# Patient Record
Sex: FEMALE | Race: WHITE | Employment: PART TIME | URBAN - METROPOLITAN AREA
[De-identification: names, ages, dates, MRNs, and addresses within clinical notes are randomized per-mention and may not be internally consistent; named-entity substitution may affect disease eponyms.]

---

## 2018-08-23 ENCOUNTER — OFFICE VISIT (OUTPATIENT)
Dept: ORTHOPEDIC SURGERY | Age: 58
End: 2018-08-23
Payer: COMMERCIAL

## 2018-08-23 VITALS — WEIGHT: 139 LBS | HEIGHT: 64 IN | BODY MASS INDEX: 23.73 KG/M2

## 2018-08-23 DIAGNOSIS — M22.42 CHONDROMALACIA, PATELLA, LEFT: Primary | ICD-10-CM

## 2018-08-23 DIAGNOSIS — M22.41 CHONDROMALACIA OF RIGHT PATELLA: ICD-10-CM

## 2018-08-23 PROCEDURE — 3017F COLORECTAL CA SCREEN DOC REV: CPT | Performed by: ORTHOPAEDIC SURGERY

## 2018-08-23 PROCEDURE — 99213 OFFICE O/P EST LOW 20 MIN: CPT | Performed by: ORTHOPAEDIC SURGERY

## 2018-08-23 PROCEDURE — 4004F PT TOBACCO SCREEN RCVD TLK: CPT | Performed by: ORTHOPAEDIC SURGERY

## 2018-08-23 PROCEDURE — G8427 DOCREV CUR MEDS BY ELIG CLIN: HCPCS | Performed by: ORTHOPAEDIC SURGERY

## 2018-08-23 PROCEDURE — G8420 CALC BMI NORM PARAMETERS: HCPCS | Performed by: ORTHOPAEDIC SURGERY

## 2018-08-23 RX ORDER — OMALIZUMAB 202.5 MG/1.4ML
INJECTION, SOLUTION SUBCUTANEOUS
COMMUNITY
Start: 2018-08-21

## 2018-08-29 ENCOUNTER — HOSPITAL ENCOUNTER (OUTPATIENT)
Dept: PHYSICAL THERAPY | Age: 58
Setting detail: THERAPIES SERIES
Discharge: HOME | End: 2018-08-29
Payer: COMMERCIAL

## 2018-08-29 PROCEDURE — 97110 THERAPEUTIC EXERCISES: CPT

## 2018-08-29 PROCEDURE — 97163 PT EVAL HIGH COMPLEX 45 MIN: CPT

## 2018-08-29 ASSESSMENT — PAIN SCALES - GENERAL: PAINLEVEL_OUTOF10: 6

## 2018-08-29 ASSESSMENT — PAIN DESCRIPTION - ORIENTATION: ORIENTATION: RIGHT;LEFT

## 2018-08-29 ASSESSMENT — PAIN DESCRIPTION - LOCATION: LOCATION: KNEE;HIP;LEG

## 2018-08-29 NOTE — PROGRESS NOTES
better on LEFS    Patient's Goal:   Walk without increase knee and leg pain    Timed Code Treatment Minutes: 15 Minutes  Total Treatment Time: 45     Time In: 14:45  Time Out: 15:30    Tara Chung  8/29/2018

## 2018-09-05 ENCOUNTER — HOSPITAL ENCOUNTER (OUTPATIENT)
Dept: PHYSICAL THERAPY | Age: 58
Setting detail: THERAPIES SERIES
Discharge: HOME | End: 2018-09-05
Payer: COMMERCIAL

## 2018-09-05 PROCEDURE — 97110 THERAPEUTIC EXERCISES: CPT

## 2018-09-05 ASSESSMENT — PAIN SCALES - GENERAL: PAINLEVEL_OUTOF10: 5

## 2018-09-05 NOTE — PROGRESS NOTES
Phone: [address] Duenas      Fax: [phone]                            Outpatient Physical Therapy                                                                            Daily Note    Date: 2018  Patient Name: Jackie Mcdonald        MRN: [id number]   ACCT#:  [de-identified]  : 1960  (60 y.o.)    Referring Practitioner: Dr Yuan Constantino    Referral Date : 18    Diagnosis: B knee chondromalacia patella  Treatment Diagnosis: B knee pain, B leg pain    Onset Date: 18 (Dr Easley)  PT Insurance Information: Medical [place]  Total # of Visits Approved: 18 Per Physician Order  Total # of Visits to Date: 2    Pre-Treatment Pain: 5/10     Assessment  Assessment: Reports some compliance with HEP over holiday weekend. Pain prior to session is 5/10. Good tolerance to addition of exercises and stretches today. Continue to progress as able. Chart Reviewed: Yes    Plan  Plan: Plan of care initiated    Exercises/Modalities/Manual:  See DocFlow Sheet    Education:       Goals  (Total # of Visits to Date: 2) Short Term Goals - Time Frame for Short term goals: 10  Short term goal 1: Patient to be independent with HEP  Short term goal 2: Increase ROM left hip ER 55 degrees  Short term goal 3: Increase strength B hip adduction 4/5  Short term goal 4:  Increase AROM left hip extension 10 degrees with strength left hip extension 4/5       Long Term Goals - Time Frame for Long term goals : 25  Long term goal 1: Gait WFL with even stride length B  Long term goal 2: Decrease pain B Knees and legs 2/10 x 3 days   Long term goal 3: Improve functional mobility with score of 60/80 or better on LEFS          Post Treatment Pain:4-5  /10    Time In:1312  Time Out : 1452        Timed Code Treatment Minutes: 40 Minutes  Total Treatment Time: 36 Minutes    [facility] License Number: [id number]    Date: 2018

## 2018-09-07 ENCOUNTER — HOSPITAL ENCOUNTER (OUTPATIENT)
Dept: PHYSICAL THERAPY | Age: 58
Setting detail: THERAPIES SERIES
Discharge: HOME | End: 2018-09-07
Payer: COMMERCIAL

## 2018-09-07 PROCEDURE — 97110 THERAPEUTIC EXERCISES: CPT

## 2018-09-07 ASSESSMENT — PAIN SCALES - GENERAL: PAINLEVEL_OUTOF10: 5

## 2018-09-12 ENCOUNTER — HOSPITAL ENCOUNTER (OUTPATIENT)
Dept: PHYSICAL THERAPY | Age: 58
Setting detail: THERAPIES SERIES
Discharge: HOME | End: 2018-09-12
Payer: COMMERCIAL

## 2018-09-12 PROCEDURE — 97110 THERAPEUTIC EXERCISES: CPT

## 2018-09-12 ASSESSMENT — PAIN SCALES - GENERAL: PAINLEVEL_OUTOF10: 5

## 2018-09-12 ASSESSMENT — PAIN DESCRIPTION - ORIENTATION: ORIENTATION: RIGHT;LEFT

## 2018-09-12 ASSESSMENT — PAIN DESCRIPTION - LOCATION: LOCATION: HIP;KNEE;LEG

## 2018-09-14 ENCOUNTER — HOSPITAL ENCOUNTER (OUTPATIENT)
Dept: PHYSICAL THERAPY | Age: 58
Setting detail: THERAPIES SERIES
Discharge: HOME | End: 2018-09-14
Payer: COMMERCIAL

## 2018-09-14 PROCEDURE — 97140 MANUAL THERAPY 1/> REGIONS: CPT

## 2018-09-14 PROCEDURE — 97110 THERAPEUTIC EXERCISES: CPT

## 2018-09-14 ASSESSMENT — PAIN SCALES - GENERAL: PAINLEVEL_OUTOF10: 5

## 2018-09-14 NOTE — PROGRESS NOTES
Phone: Yovana Duenas      Fax: [phone]                            Outpatient Physical Therapy                                                                            Daily Note    Date: 2018  Patient Name: Bea Avery        MRN: [id number]   ACCT#:  [de-identified]  : 1960  (60 y.o.)    Referring Practitioner: Dr Liza Cornejo    Referral Date : 18    Diagnosis: B knee chondromalacia patella  Treatment Diagnosis: B knee pain, B leg pain    Onset Date: 18 (Dr Easley)  PT Insurance Information: Medical [place]  Total # of Visits Approved: 18 Per Physician Order  Total # of Visits to Date: 6    Pre-Treatment Pain:  5/10     Assessment  Assessment: Patient reports able to climb up and up stairs using reciprocal pattern without using UE for assistance. PROM L ER = 54 degrees. Chart Reviewed: Yes    Plan  Plan: Continue with current plan    Exercises/Modalities/Manual:  See DocFlow Sheet    Education:           Goals  (Total # of Visits to Date: 6)   Short Term Goals - Time Frame for Short term goals: 10  Short term goal 1: Patient to be independent with HEP-Met  Short term goal 2: Increase ROM left hip ER 55 degrees  Short term goal 3: Increase strength B hip adduction 4/5  Short term goal 4:  Increase AROM left hip extension 10 degrees with strength left hip extension 4/5       Long Term Goals - Time Frame for Long term goals : 25  Long term goal 1: Gait WFL with even stride length B  Long term goal 2: Decrease pain B Knees and legs 2/10 x 3 days   Long term goal 3: Improve functional mobility with score of 60/80 or better on LEFS          Post Treatment Pain:  6/10    Time In: 1330  Time Out: 1418  Timed Code Treatment Minutes: 48 Minutes  Total Treatment Time: [facility] Number: PTA    Date: 2018

## 2018-09-17 ENCOUNTER — APPOINTMENT (OUTPATIENT)
Dept: PHYSICAL THERAPY | Age: 58
End: 2018-09-17
Payer: COMMERCIAL

## 2018-09-19 ENCOUNTER — HOSPITAL ENCOUNTER (OUTPATIENT)
Dept: PHYSICAL THERAPY | Age: 58
Setting detail: THERAPIES SERIES
Discharge: HOME | End: 2018-09-19
Payer: COMMERCIAL

## 2018-09-19 PROCEDURE — 97110 THERAPEUTIC EXERCISES: CPT

## 2018-09-19 PROCEDURE — 97140 MANUAL THERAPY 1/> REGIONS: CPT

## 2018-09-19 ASSESSMENT — PAIN SCALES - GENERAL: PAINLEVEL_OUTOF10: 6

## 2018-09-20 ENCOUNTER — OFFICE VISIT (OUTPATIENT)
Dept: ORTHOPEDIC SURGERY | Age: 58
End: 2018-09-20
Payer: COMMERCIAL

## 2018-09-20 VITALS — WEIGHT: 139 LBS | BODY MASS INDEX: 23.73 KG/M2 | HEIGHT: 64 IN

## 2018-09-20 DIAGNOSIS — M22.41 CHONDROMALACIA OF RIGHT PATELLA: Primary | ICD-10-CM

## 2018-09-20 PROCEDURE — G8427 DOCREV CUR MEDS BY ELIG CLIN: HCPCS | Performed by: ORTHOPAEDIC SURGERY

## 2018-09-20 PROCEDURE — 99213 OFFICE O/P EST LOW 20 MIN: CPT | Performed by: ORTHOPAEDIC SURGERY

## 2018-09-20 PROCEDURE — 1036F TOBACCO NON-USER: CPT | Performed by: ORTHOPAEDIC SURGERY

## 2018-09-20 PROCEDURE — G8420 CALC BMI NORM PARAMETERS: HCPCS | Performed by: ORTHOPAEDIC SURGERY

## 2018-09-20 PROCEDURE — 3017F COLORECTAL CA SCREEN DOC REV: CPT | Performed by: ORTHOPAEDIC SURGERY

## 2018-09-21 ENCOUNTER — HOSPITAL ENCOUNTER (OUTPATIENT)
Dept: PHYSICAL THERAPY | Age: 58
Setting detail: THERAPIES SERIES
Discharge: HOME | End: 2018-09-21
Payer: COMMERCIAL

## 2018-09-21 PROCEDURE — 97110 THERAPEUTIC EXERCISES: CPT

## 2018-09-21 ASSESSMENT — PAIN SCALES - GENERAL: PAINLEVEL_OUTOF10: 6

## 2018-09-24 ENCOUNTER — HOSPITAL ENCOUNTER (OUTPATIENT)
Dept: PHYSICAL THERAPY | Age: 58
Setting detail: THERAPIES SERIES
Discharge: HOME | End: 2018-09-24
Payer: COMMERCIAL

## 2018-09-24 PROCEDURE — 97140 MANUAL THERAPY 1/> REGIONS: CPT

## 2018-09-24 PROCEDURE — 97110 THERAPEUTIC EXERCISES: CPT

## 2018-09-24 ASSESSMENT — PAIN DESCRIPTION - LOCATION: LOCATION: KNEE

## 2018-09-24 ASSESSMENT — PAIN SCALES - GENERAL: PAINLEVEL_OUTOF10: 6

## 2018-09-24 ASSESSMENT — PAIN DESCRIPTION - ORIENTATION: ORIENTATION: RIGHT

## 2018-09-24 NOTE — PROGRESS NOTES
Phone: [address] [facility]      Fax: [phone]                            Outpatient Physical Therapy                                                                            Daily Note    Date: 2018  Patient Name: Laverne Mariee        MRN: [id number]   ACCT#:  [de-identified]  : 1960  (60 y.o.)    Referring Practitioner: Dr Scott Cooper    Referral Date : 18    Diagnosis: B knee chondromalacia patella  Treatment Diagnosis: B knee pain, B leg pain    Onset Date: 18 (Dr Easley)  PT Insurance Information: Medical [place]  Total # of Visits Approved: 18 Per Physician Order  Total # of Visits to Date: 9    Pre-Treatment Pain:  6/10     Assessment  Assessment: Main c/o pain 6/10 right knee. Patient notes some improvement in strength, she can alternate feetbup stairs without handrails, but still non-alternate down stairs. Strength MMT hip adduction 4/5 B; hip extension right 4-/5, left 4/5. Hip extension left leg over 10 degrees. Patient met [facility], work toward [address] [place] [address] Chart Reviewed: Yes    Plan  Plan: Continue with current plan    Exercises/Modalities/Manual:  See DocFlow Sheet    Education:           Goals  (Total # of Visits to Date: 5)   Short Term Goals - Time Frame for Short term goals: 10  Short term goal 1: Patient to be independent with HEP-Met  Short term goal 2: Increase ROM left hip ER 55 degrees- Met  Short term goal 3: Increase strength B hip adduction 4/5-Met  Short term goal 4:  Increase AROM left hip extension 10 degrees with strength left hip extension 4/5-Met       Long Term Goals - Time Frame for Long term goals : 25  Long term goal 1: Gait WFL with even stride length B  Long term goal 2: Decrease pain B Knees and legs 2/10 x 3 days   Long term goal 3: Improve functional mobility with score of 60/80 or better on LEFS          Post Treatment Pain:  /10    Time In: 13:10    Time Out : 14:00        Timed Code Treatment Minutes: 50 Minutes  Total

## 2018-09-28 ENCOUNTER — HOSPITAL ENCOUNTER (OUTPATIENT)
Dept: PHYSICAL THERAPY | Age: 58
Setting detail: THERAPIES SERIES
Discharge: HOME | End: 2018-09-28
Payer: COMMERCIAL

## 2018-09-28 PROCEDURE — 97140 MANUAL THERAPY 1/> REGIONS: CPT

## 2018-09-28 PROCEDURE — 97110 THERAPEUTIC EXERCISES: CPT

## 2018-09-28 ASSESSMENT — PAIN SCALES - GENERAL: PAINLEVEL_OUTOF10: 5

## 2018-09-28 NOTE — PROGRESS NOTES
Phone: [address]      Fax: [phone]                            Outpatient Physical Therapy                                                                            Daily Note    Date: 2018  Patient Name: Alayna Shannon        MRN: [id number]   ACCT#:  [de-identified]  : 1960  (60 y.o.)    Referring Practitioner: Dr Wallace Junior    Referral Date : 18    Diagnosis: B knee chondromalacia patella  Treatment Diagnosis: B knee pain, B leg pain    Onset Date: 18 (Dr Easley)  PT Insurance Information: Medical [place]  Total # of Visits Approved: 18 Per Physician Order  Total # of Visits to Date: 10  No Show: 0  Canceled Appointment: 0    Pre-Treatment Pain:  10     Assessment  Assessment: Pt able to ambulate up and down 6\" step in clinic reciprically today. She notes at times she can descend stairs reciprically in community but can always ascend reciprically. Pt is pleased with progress. Chart Reviewed: Yes    Plan  Plan: Continue with current plan    Exercises/Modalities/Manual:  See DocFlow Sheet    Goals  (Total # of Visits to Date: 10)   Short Term Goals - Time Frame for Short term goals: 10  Short term goal 1: Patient to be independent with HEP-Met  Short term goal 2: Increase ROM left hip ER 55 degrees- Met  Short term goal 3: Increase strength B hip adduction 4/5-Met  Short term goal 4:  Increase AROM left hip extension 10 degrees with strength left hip extension 4/5-Met    Long Term Goals - Time Frame for Long term goals : 25  Long term goal 1: Gait WFL with even stride length B  Long term goal 2: Decrease pain B Knees and legs 2/10 x 3 days   Long term goal 3: Improve functional mobility with score of 60/80 or better on LEFS    Post Treatment Pain:  /10    Time In: 1330    Time Out : 1410  Timed Code Treatment Minutes: 40 Minutes  Total Treatment Time: 36 Minutes    [facility] License Number: [id number]    Date: 2018

## 2019-11-05 ENCOUNTER — TELEPHONE (OUTPATIENT)
Dept: ORTHOPEDIC SURGERY | Age: 59
End: 2019-11-05

## 2019-11-05 DIAGNOSIS — M17.11 PATELLOFEMORAL ARTHRITIS OF RIGHT KNEE: Primary | ICD-10-CM

## 2019-11-19 ENCOUNTER — OFFICE VISIT (OUTPATIENT)
Dept: ORTHOPEDIC SURGERY | Age: 59
End: 2019-11-19
Payer: COMMERCIAL

## 2019-11-19 VITALS — BODY MASS INDEX: 23.73 KG/M2 | HEIGHT: 64 IN | WEIGHT: 139 LBS

## 2019-11-19 DIAGNOSIS — M17.31 POST-TRAUMATIC OSTEOARTHRITIS OF RIGHT KNEE: Primary | ICD-10-CM

## 2019-11-19 PROBLEM — M17.11 OSTEOARTHRITIS OF RIGHT PATELLOFEMORAL JOINT: Status: ACTIVE | Noted: 2019-11-19

## 2019-11-19 PROCEDURE — 3017F COLORECTAL CA SCREEN DOC REV: CPT | Performed by: ORTHOPAEDIC SURGERY

## 2019-11-19 PROCEDURE — 1036F TOBACCO NON-USER: CPT | Performed by: ORTHOPAEDIC SURGERY

## 2019-11-19 PROCEDURE — 99213 OFFICE O/P EST LOW 20 MIN: CPT | Performed by: ORTHOPAEDIC SURGERY

## 2019-11-19 PROCEDURE — G8420 CALC BMI NORM PARAMETERS: HCPCS | Performed by: ORTHOPAEDIC SURGERY

## 2019-11-19 PROCEDURE — 20610 DRAIN/INJ JOINT/BURSA W/O US: CPT | Performed by: ORTHOPAEDIC SURGERY

## 2019-11-19 PROCEDURE — G8427 DOCREV CUR MEDS BY ELIG CLIN: HCPCS | Performed by: ORTHOPAEDIC SURGERY

## 2019-11-19 PROCEDURE — G8484 FLU IMMUNIZE NO ADMIN: HCPCS | Performed by: ORTHOPAEDIC SURGERY

## 2019-11-19 RX ORDER — LIDOCAINE HYDROCHLORIDE 10 MG/ML
5 INJECTION, SOLUTION INFILTRATION; PERINEURAL ONCE
Status: COMPLETED | OUTPATIENT
Start: 2019-11-19 | End: 2019-11-19

## 2019-11-19 RX ORDER — METHYLPREDNISOLONE ACETATE 40 MG/ML
40 INJECTION, SUSPENSION INTRA-ARTICULAR; INTRALESIONAL; INTRAMUSCULAR; SOFT TISSUE ONCE
Status: COMPLETED | OUTPATIENT
Start: 2019-11-19 | End: 2019-11-19

## 2019-11-19 RX ADMIN — METHYLPREDNISOLONE ACETATE 40 MG: 40 INJECTION, SUSPENSION INTRA-ARTICULAR; INTRALESIONAL; INTRAMUSCULAR; SOFT TISSUE at 12:57

## 2019-11-19 RX ADMIN — LIDOCAINE HYDROCHLORIDE 5 ML: 10 INJECTION, SOLUTION INFILTRATION; PERINEURAL at 12:58

## 2019-12-11 ENCOUNTER — PROCEDURE VISIT (OUTPATIENT)
Dept: ORTHOPEDIC SURGERY | Age: 59
End: 2019-12-11

## 2019-12-11 ENCOUNTER — TELEPHONE (OUTPATIENT)
Dept: ADMINISTRATIVE | Age: 59
End: 2019-12-11

## 2019-12-11 VITALS — BODY MASS INDEX: 23.05 KG/M2 | HEIGHT: 64 IN | WEIGHT: 135 LBS

## 2019-12-11 DIAGNOSIS — S83.001D SUBLUXATION OF RIGHT PATELLA, SUBSEQUENT ENCOUNTER: Primary | ICD-10-CM

## 2019-12-20 ENCOUNTER — HOSPITAL ENCOUNTER (OUTPATIENT)
Dept: CT IMAGING | Age: 59
Discharge: HOME | End: 2019-12-22
Payer: COMMERCIAL

## 2019-12-20 DIAGNOSIS — S83.001D SUBLUXATION OF RIGHT PATELLA, SUBSEQUENT ENCOUNTER: ICD-10-CM

## 2019-12-20 PROCEDURE — 73700 CT LOWER EXTREMITY W/O DYE: CPT

## 2019-12-27 ENCOUNTER — TELEPHONE (OUTPATIENT)
Dept: ADMINISTRATIVE | Age: 59
End: 2019-12-27

## 2020-01-07 ENCOUNTER — OFFICE VISIT (OUTPATIENT)
Dept: ORTHOPEDIC SURGERY | Age: 60
End: 2020-01-07
Payer: COMMERCIAL

## 2020-01-07 VITALS — HEIGHT: 64 IN | WEIGHT: 138 LBS | BODY MASS INDEX: 23.56 KG/M2

## 2020-01-07 PROCEDURE — 99213 OFFICE O/P EST LOW 20 MIN: CPT | Performed by: ORTHOPAEDIC SURGERY

## 2020-01-07 PROCEDURE — G8427 DOCREV CUR MEDS BY ELIG CLIN: HCPCS | Performed by: ORTHOPAEDIC SURGERY

## 2020-01-07 PROCEDURE — 3017F COLORECTAL CA SCREEN DOC REV: CPT | Performed by: ORTHOPAEDIC SURGERY

## 2020-01-07 PROCEDURE — 1036F TOBACCO NON-USER: CPT | Performed by: ORTHOPAEDIC SURGERY

## 2020-01-07 PROCEDURE — G8484 FLU IMMUNIZE NO ADMIN: HCPCS | Performed by: ORTHOPAEDIC SURGERY

## 2020-01-07 PROCEDURE — G8420 CALC BMI NORM PARAMETERS: HCPCS | Performed by: ORTHOPAEDIC SURGERY

## 2020-04-06 ENCOUNTER — HOSPITAL ENCOUNTER (OUTPATIENT)
Dept: PHYSICAL THERAPY | Age: 60
Setting detail: THERAPIES SERIES
Discharge: HOME | End: 2020-04-06
Payer: COMMERCIAL

## 2020-04-06 PROCEDURE — 97161 PT EVAL LOW COMPLEX 20 MIN: CPT

## 2020-04-06 PROCEDURE — 97110 THERAPEUTIC EXERCISES: CPT

## 2020-04-06 ASSESSMENT — PAIN SCALES - GENERAL: PAINLEVEL_OUTOF10: 1

## 2020-04-13 ENCOUNTER — HOSPITAL ENCOUNTER (OUTPATIENT)
Dept: PHYSICAL THERAPY | Age: 60
Setting detail: THERAPIES SERIES
Discharge: HOME | End: 2020-04-13
Payer: COMMERCIAL

## 2020-04-13 PROCEDURE — 97110 THERAPEUTIC EXERCISES: CPT

## 2020-04-13 ASSESSMENT — PAIN SCALES - GENERAL: PAINLEVEL_OUTOF10: 1

## 2020-04-20 ENCOUNTER — HOSPITAL ENCOUNTER (OUTPATIENT)
Dept: PHYSICAL THERAPY | Age: 60
Setting detail: THERAPIES SERIES
Discharge: HOME | End: 2020-04-20
Payer: COMMERCIAL

## 2020-04-20 PROCEDURE — 97110 THERAPEUTIC EXERCISES: CPT

## 2020-04-20 ASSESSMENT — PAIN SCALES - GENERAL: PAINLEVEL_OUTOF10: 1

## 2020-04-20 NOTE — PROGRESS NOTES
Phone: [address]      Fax: [phone]                            Outpatient Physical Therapy                                                                            Daily Note    Date: 2020  Patient Name: Serapio Riedel        MRN: [id number]   ACCT#:  [de-identified]  : 1960  (60 y.o.)    Referring Practitioner: Dr Isaias Klein    Referral Date : 20    Diagnosis: Right Partial knee replacement  Treatment Diagnosis: R knee pain    Onset Date: 20  PT Insurance Information: 40v only  Total # of Visits Approved: 12 Per Physician Order  Total # of Visits to Date: 3  No Show: 0  Canceled Appointment: 0    Pre-Treatment Pain:  1/10     Assessment  Assessment: AROM: [id number]  PROM [id number]. Plan to measure PROM ext next visit. AROM Ext=4deg FN  Pt is progressing well. Balance continues to improve, pt amb without AD an no noted deviations. Chart Reviewed: Yes    Plan  Plan: Continue with current plan    Exercises/Modalities/Manual:  See DocFlow Sheet    Education: Improved ROM both active and passive     Barriers to Learning: None    Goals  (Total # of Visits to Date: 3)   Short Term Goals - Time Frame for Short term goals: 6 visits  Short term goal 1: Pt to report independence and compliance with HEP.-MET  Short term goal 2: Pt to have 110deg of PROM knee flexion to improve squatting demarco. -MET             Long Term Goals - Time Frame for Long term goals : 12 visits  Long term goal 1: Pt to score >35/80 on LEFS to improve ADL demarco. Long term goal 2: Pt to have 4/5 hip ABD strength to improve standing demarco. Long term goal 3: Pt to have AROM 0deg to improve step length and gait symmetry. Long term goal 4: Pt to have SLS on uneven surface 10sec 2:3 trials to improve outdoor amb safety and demarco.        Post Treatment Pain:  1/10    Time In: 4501  Time Out: 1015  Timed Code Treatment Minutes: 38 Minutes  Total Treatment Time: [address] [facility] Jus, PT     Date: 4/20/2020

## 2020-04-27 ENCOUNTER — HOSPITAL ENCOUNTER (OUTPATIENT)
Dept: PHYSICAL THERAPY | Age: 60
Setting detail: THERAPIES SERIES
Discharge: HOME | End: 2020-04-27
Payer: COMMERCIAL

## 2020-04-27 PROCEDURE — 97110 THERAPEUTIC EXERCISES: CPT

## 2020-04-27 ASSESSMENT — PAIN SCALES - GENERAL: PAINLEVEL_OUTOF10: 1

## 2020-04-27 NOTE — PROGRESS NOTES
Phone: [address]      Fax: [phone]                            Outpatient Physical Therapy                                                                            Daily Note    Date: 2020  Patient Name: Bella Godoy        MRN: [id number]   ACCT#:  [de-identified]  : 1960  (60 y.o.)    Referring Practitioner: Dr Forest Baron    Referral Date : 20    Diagnosis: Right Partial knee replacement  Treatment Diagnosis: R knee pain    Onset Date: 20  PT Insurance Information: 40v only  Total # of Visits Approved: 12 Per Physician Order  Total # of Visits to Date: 4  No Show: 0  Canceled Appointment: 0    Pre-Treatment Pain:  1/10     Assessment  Assessment: Plan to initiate crate lifts from stool next visit. Pt reports she may return to work next week for 3hour shifts. Pt reports working in her yard over the weekend for 3 hours with increased swelling and soreness but was able to complete bending/walking/stooping activities. AROM=0-115deg; physician is pleased with pt progress. Chart Reviewed: Yes    Plan  Plan: Continue with current plan    Exercises/Modalities/Manual:  See DocFlow Sheet    Education: Improved ROM, Ext to neutral     Barriers to Learning: None    Goals  (Total # of Visits to Date: 4)   Short Term Goals - Time Frame for Short term goals: 6 visits  Short term goal 1: Pt to report independence and compliance with HEP.-MET  Short term goal 2: Pt to have 110deg of PROM knee flexion to improve squatting demarco. -MET    Long Term Goals - Time Frame for Long term goals : 12 visits  Long term goal 1: Pt to score >35/80 on LEFS to improve ADL demarco. Long term goal 2: Pt to have 4/5 hip ABD strength to improve standing demarco. Long term goal 3: Pt to have AROM 0deg to improve step length and gait symmetry. -MET  Long term goal 4: Pt to have SLS on uneven surface 10sec 2:3 trials to improve outdoor amb safety and demarco.        Post Treatment Pain: 0/10    Time

## 2020-05-06 ENCOUNTER — HOSPITAL ENCOUNTER (OUTPATIENT)
Dept: PHYSICAL THERAPY | Age: 60
Setting detail: THERAPIES SERIES
Discharge: HOME | End: 2020-05-06
Payer: COMMERCIAL

## 2020-05-06 PROCEDURE — 97110 THERAPEUTIC EXERCISES: CPT

## 2020-05-06 ASSESSMENT — PAIN SCALES - GENERAL: PAINLEVEL_OUTOF10: 1

## 2020-05-11 ENCOUNTER — HOSPITAL ENCOUNTER (OUTPATIENT)
Dept: PHYSICAL THERAPY | Age: 60
Setting detail: THERAPIES SERIES
Discharge: HOME | End: 2020-05-11
Payer: COMMERCIAL

## 2020-05-11 PROCEDURE — 97110 THERAPEUTIC EXERCISES: CPT

## 2020-05-11 ASSESSMENT — PAIN SCALES - GENERAL: PAINLEVEL_OUTOF10: 1

## 2023-05-17 ENCOUNTER — APPOINTMENT (RX ONLY)
Dept: URBAN - METROPOLITAN AREA CLINIC 308 | Facility: CLINIC | Age: 63
Setting detail: DERMATOLOGY
End: 2023-05-17

## 2023-05-17 DIAGNOSIS — S31000A OPEN WOUND(S) (MULTIPLE) OF UNSPECIFIED SITE(S), WITHOUT MENTION OF COMPLICATION: ICD-10-CM

## 2023-05-17 PROBLEM — S01.501A UNSPECIFIED OPEN WOUND OF LIP, INITIAL ENCOUNTER: Status: ACTIVE | Noted: 2023-05-17

## 2023-05-17 PROBLEM — C44.01 BASAL CELL CARCINOMA OF SKIN OF LIP: Status: ACTIVE | Noted: 2023-05-17

## 2023-05-17 PROBLEM — Z01.818 ENCOUNTER FOR OTHER PREPROCEDURAL EXAMINATION: Status: ACTIVE | Noted: 2023-05-17

## 2023-05-17 PROCEDURE — ? REPAIR NOTE

## 2023-05-17 PROCEDURE — 14060 TIS TRNFR E/N/E/L 10 SQ CM/<: CPT

## 2023-05-17 PROCEDURE — 99202 OFFICE O/P NEW SF 15 MIN: CPT | Mod: 57

## 2023-05-17 PROCEDURE — ? SURGICAL DECISION MAKING

## 2023-05-17 PROCEDURE — 17312 MOHS ADDL STAGE: CPT

## 2023-05-17 PROCEDURE — ? MOHS SURGERY

## 2023-05-17 PROCEDURE — 17311 MOHS 1 STAGE H/N/HF/G: CPT

## 2023-05-17 ASSESSMENT — LOCATION SIMPLE DESCRIPTION DERM: LOCATION SIMPLE: RIGHT LIP

## 2023-05-17 ASSESSMENT — LOCATION ZONE DERM: LOCATION ZONE: LIP

## 2023-05-17 ASSESSMENT — LOCATION DETAILED DESCRIPTION DERM: LOCATION DETAILED: RIGHT UPPER CUTANEOUS LIP

## 2023-05-17 NOTE — PROCEDURE: MIPS QUALITY
independent
Quality 431: Preventive Care And Screening: Unhealthy Alcohol Use - Screening: Patient not identified as an unhealthy alcohol user when screened for unhealthy alcohol use using a systematic screening method
Detail Level: Detailed
Quality 226: Preventive Care And Screening: Tobacco Use: Screening And Cessation Intervention: Patient screened for tobacco use and is an ex/non-smoker
Quality 130: Documentation Of Current Medications In The Medical Record: Current Medications Documented

## 2023-05-17 NOTE — PROCEDURE: SURGICAL DECISION MAKING
Initial Decision For Surgery?: Yes
Discussion: Risks, benefits and alternative treatment options discussed with patient as outlined below:
Date Of Surgery - Today Or Tomorrow?: today
Complexity (Necessary For Coding; Major - 90 Day Global With Some Exceptions; Minor - 10 Day Global): major
Risk Assessment Explanation (Does Not Render In The Note): Clinical determination of the probability and/or consequences of an event, such as surgery. Clinical assessment of the level of risk is affected by the nature of the event under consideration for the patient. Modifier 57 is used to indicate an Evaluation and Management (E/M) service resulted in the initial decision to perform surgery either the day before a major surgery (90 day global) or the day of a major surgery.

## 2023-05-17 NOTE — PROCEDURE: MOHS SURGERY
Body Location Override (Optional - Billing Will Still Be Based On Selected Body Map Location If Applicable): Right alar crease/apex of right upper cutaneous lip

## 2023-05-17 NOTE — PROCEDURE: REPAIR NOTE
Billing Type: Third-Party Bill Post-Care Instructions: I reviewed with the patient in detail post-care instructions. Patient is not to engage in any heavy lifting, exercise, or swimming for the next 14 days. Should the patient develop any fevers, chills, bleeding, severe pain patient will contact the office immediately.

## 2025-06-18 ENCOUNTER — HOSPITAL ENCOUNTER (OUTPATIENT)
Dept: PHYSICAL THERAPY | Age: 65
Setting detail: THERAPIES SERIES
Discharge: HOME | End: 2025-06-18
Payer: MEDICARE

## 2025-06-18 PROCEDURE — 97161 PT EVAL LOW COMPLEX 20 MIN: CPT

## 2025-06-18 PROCEDURE — 97110 THERAPEUTIC EXERCISES: CPT

## 2025-06-18 ASSESSMENT — PAIN SCALES - GENERAL: PAINLEVEL_OUTOF10: 0

## 2025-06-18 NOTE — PLAN OF CARE
[facility]       Phone: [phone]   Date: 2025                      Outpatient Physical Therapy  Fax: [phone]    ACCT #: [id number]                     Plan of Care  [facility]#: [id number]  Patient: Jenae Moreno  : 1960    Referring Provider (secondary): Esau Leblanc    Diagnosis: Greater trochanteric bursitis  Onset Date: 25  Treatment Diagnosis: Left hip pain    Assessment  Body Structures, Functions, Activity Limitations Requiring Skilled Therapeutic Intervention: Decreased functional mobility , Decreased ADL status, Decreased ROM, Decreased body mechanics, Decreased tolerance to work activity, Decreased strength, Decreased posture, Increased pain  Assessment: Patient presents with left hip and lateral thigh pain.  Signs and symptoms of ITB tendonitis and bursitis.  Some testing positive for OA with groin pain.  She is quite tender along ITB and bursa.  Will treat with manual therapy including CFM and possibly IDN, hip strengthening and stretching.  Therapy Prognosis: Good    Treatment Plan   Days: 2 times per week Weeks: 4 weeks Total # of Visits Approved: 8    Patient Education/HEP, Therapeutic Exercise, Manual Therapy: Myofacial Release/Cupping, Manual Therapy: Mobilization/Manipulation, Neuro Re-ed, Gait Training, Dry Needling, HP/CP, and Therapeutic Activity     Goals  Short Term Goals  Time Frame for Short Term Goals: 4 visits  Short Term Goal 1: Patient will demonstrate compliance with HEP to optimize therapy progress  Long Term Goals  Time Frame for Long Term Goals : 8 visits  Long Term Goal 1: Patient will improve left hip MMT to 4+/5 in all planes to reduce load on ITB  Long Term Goal 2: Patient will improve left ITB flexibility to min limitation to reduce pain  Long Term Goal 3: Patient will demonstrate safe ambulation without cane use  Long Term Goal 4: Patient will improve LEFS score from 37/80 to 50/80 to demonstrate functional improvement     Wild Acevedo

## 2025-06-18 NOTE — THERAPY EVALUATION
Phone: [phone]                       [facility]         Fax: [phone]                      Outpatient Physical Therapy                                                                      Evaluation    Date: 2025  Patient: Jenae Moreno  : 1960  ACCT #: [id number]    Referring Provider (secondary): Esau Leblanc    Diagnosis: Greater trochanteric bursitis    Treatment Diagnosis: Left hip pain  Onset Date: 25  PT Insurance Information: Medicare, [facility] secondary  Total # of Visits Approved: 8 Per Physician Order  Total # of Visits to Date: 1     Subjective     Additional Pertinent Hx: Patient presents with c/o left hip pain.  Pain also down left lateral leg.  Was told ITB and bursitis.  Given injection 2 days ago and PT script.  Pain started about 1 month ago, insidious onset.  Started with pain in lateral knee.  Saw Dr. La who referred to hip specialist.  X-rays show severe OA.  Pain is constant but worse when moving.  Now walking with cane.  Denies buckling, clicking/poppping.  Does note locking in the knee at times.  Taking muscle relaxor for symptoms.  PMHx: Right [facility], left hip fracture , asthma.  Pain Assessment  Pain Level: 0          Objective           Strength RLE  R Hip Flexion: 5/5  R Hip Extension: 4-/5  R Hip ABduction: 4-/5  R Hip ADduction: 5/5  R Hip Internal Rotation: 4+/5  R Hip External Rotation: 4/5  Strength LLE  L Hip Flexion: 5/5  L Hip Extension: 3+/5  L Hip ABduction: 4/5  L Hip ADduction: 5/5  L Hip Internal Rotation: 4+/5  L Hip External Rotation: 4-/5  AROM LLE (degrees)  L Hip Flexion (0-125): 106 some tightness  L Hip ABduction (0-45): 19 with pain  L Hip External Rotation (0-45): 15  L Hip Internal Rotation (0-45): 22       AROM LLE (degrees)  L Hip Flexion (0-125): 106 some tightness  L Hip ABduction (0-45): 19 with pain  L Hip External Rotation (0-45): 15  L Hip Internal Rotation (0-45): 22              Additional

## 2025-06-24 ENCOUNTER — HOSPITAL ENCOUNTER (OUTPATIENT)
Dept: PHYSICAL THERAPY | Age: 65
Setting detail: THERAPIES SERIES
Discharge: HOME | End: 2025-06-24
Payer: MEDICARE

## 2025-06-24 PROCEDURE — 97110 THERAPEUTIC EXERCISES: CPT

## 2025-06-24 PROCEDURE — 20561 NDL INSJ W/O NJX 3+ MUSC: CPT

## 2025-06-24 ASSESSMENT — PAIN SCALES - GENERAL: PAINLEVEL_OUTOF10: 2

## 2025-06-24 NOTE — PROGRESS NOTES
Phone: [phone]                 [facility]      Fax: [phone]                            Outpatient Physical Therapy                                                                            Daily Note    Date: 2025  Patient Name: Jenae Moreno        MRN: [id number]   ACCT#:  [id number]  : 1960  (65 y.o.)    Referring Provider (secondary): Esau Leblanc         Diagnosis: Greater trochanteric bursitis  Treatment Diagnosis: Left hip pain    Onset Date: 25  PT Insurance Information: Medicare, [place] [facility] secondary  Total # of Visits Approved: 8 Per Physician Order  Total # of Visits to Date: 2  Plan of Care/Certification Expiration Date: 25    Pre-Treatment Pain:  2/10  Subjective: 1535:  Improvement from injection.  Still rates 2/10.  Reports doing HEP once daily.  Assessment  Assessment: Good tolerance to dry needling.  Some soreness distally over ITB though.  Minimal cues required for exercises.  Some pain with strengthening as well.    Plan  Continue with current plan of care    Exercises/Modalities/Manual:  See DocFlow Sheet    Education: Education on IDN expectations            Goals  (Total # of Visits to Date: 2)   Short Term Goals  Time Frame for Short Term Goals: 4 visits  Short Term Goal 1: Patient will demonstrate compliance with HEP to optimize therapy progress    Long Term Goals  Time Frame for Long Term Goals : 8 visits  Long Term Goal 1: Patient will improve left hip MMT to 4+/5 in all planes to reduce load on ITB  Long Term Goal 2: Patient will improve left ITB flexibility to min limitation to reduce pain  Long Term Goal 3: Patient will demonstrate safe ambulation without cane use  Long Term Goal 4: Patient will improve LEFS score from 37/80 to 50/80 to demonstrate functional improvement    Treatment Tolerance:  Treatment Tolerance: Tolerated treatment well.    Post Treatment Pain:  3/10    Time In: 1535    Time Out : 1611        Timed Code Treatment

## 2025-06-26 ENCOUNTER — HOSPITAL ENCOUNTER (OUTPATIENT)
Dept: PHYSICAL THERAPY | Age: 65
Setting detail: THERAPIES SERIES
Discharge: HOME | End: 2025-06-26
Payer: MEDICARE

## 2025-06-26 PROCEDURE — 20561 NDL INSJ W/O NJX 3+ MUSC: CPT

## 2025-06-26 PROCEDURE — 97110 THERAPEUTIC EXERCISES: CPT

## 2025-06-26 ASSESSMENT — PAIN SCALES - GENERAL: PAINLEVEL_OUTOF10: 2

## 2025-06-26 NOTE — PROGRESS NOTES
Phone: [phone]                 [place]      Fax: [phone]                            Outpatient Physical Therapy                                                                            Daily Note    Date: 2025  Patient Name: Jenae Moreno        MRN: [id number]   ACCT#:  [id number]  : 1960  (65 y.o.)    Referring Provider (secondary): Esau Leblanc         Diagnosis: Greater trochanteric bursitis  Treatment Diagnosis: Left hip pain    Onset Date: 25  PT Insurance Information: Medicare, [place] [facility] [employer] secondary  Total # of Visits Approved: 8 Per Physician Order  Total # of Visits to Date: 3  Plan of Care/Certification Expiration Date: 25    Pre-Treatment Pain:  2/10  Subjective: 0845:  Felt stiff yesterday, \"but today's it's fine.  Not as tender to the touch.\"  Tripped and fell yesterday.  Skinned elbow on right.  No other injuries.  Assessment  Assessment: Good tolerance to dry needling progression.  Did well with strength also.  Notes some pain when standing on left for hip abduction and during single leg shuttle.    Plan  Continue with current plan of care    Exercises/Modalities/Manual:  See DocFlow Sheet    Education: Education on progressions            Goals  (Total # of Visits to Date: 3)   Short Term Goals  Time Frame for Short Term Goals: 4 visits  Short Term Goal 1: Patient will demonstrate compliance with HEP to optimize therapy progress    Long Term Goals  Time Frame for Long Term Goals : 8 visits  Long Term Goal 1: Patient will improve left hip MMT to 4+/5 in all planes to reduce load on ITB  Long Term Goal 2: Patient will improve left ITB flexibility to min limitation to reduce pain  Long Term Goal 3: Patient will demonstrate safe ambulation without cane use  Long Term Goal 4: Patient will improve LEFS score from 37/80 to 50/80 to demonstrate functional improvement    Treatment Tolerance:  Treatment Tolerance: Tolerated treatment well.    Post

## 2025-06-30 ENCOUNTER — HOSPITAL ENCOUNTER (OUTPATIENT)
Dept: PHYSICAL THERAPY | Age: 65
Setting detail: THERAPIES SERIES
Discharge: HOME | End: 2025-06-30
Payer: MEDICARE

## 2025-06-30 PROCEDURE — 97110 THERAPEUTIC EXERCISES: CPT

## 2025-06-30 PROCEDURE — 97140 MANUAL THERAPY 1/> REGIONS: CPT

## 2025-06-30 NOTE — PROGRESS NOTES
Phone: [phone]                 [facility]      Fax: [phone]                            Outpatient Physical Therapy                                                                            Daily Note    Date: 2025  Patient Name: Jenae Moreno        MRN: [id number]   ACCT#:  [id number]  : 1960  (65 y.o.)    Referring Provider (secondary): Esau Leblanc         Diagnosis: Greater trochanteric bursitis  Treatment Diagnosis: Left hip pain    Onset Date: 25  PT Insurance Information: Medicare, [place] [facility] [employer] secondary  Total # of Visits Approved: 8 Per Physician Order  Total # of Visits to Date: 4  Plan of Care/Certification Expiration Date: 25    Pre-Treatment Pain:  0/10  Subjective: 0914:  Questioning if IDN is helping.  Would like to hold off from this for a few visits.  Stiff today, but denies pain.  Pain is improving, but was sore Friday.  \"I over did it Saturday.  Before I couldn't even put on shoe or sock, but I can now.\"  Assessment  Assessment: Tender with CFM work, but tolerated well.  Did well with strength progressions.  Vaulting noted with lateral step ups though.    Plan  Continue with current plan of care    Exercises/Modalities/Manual:  See DocFlow Sheet    Education: Education on exercise progressions and IDN expectations            Goals  (Total # of Visits to Date: 4)   Short Term Goals  Time Frame for Short Term Goals: 4 visits  Short Term Goal 1: Patient will demonstrate compliance with HEP to optimize therapy progress  STG Goal 1 Status:: Met    Long Term Goals  Time Frame for Long Term Goals : 8 visits  Long Term Goal 1: Patient will improve left hip MMT to 4+/5 in all planes to reduce load on ITB  Long Term Goal 2: Patient will improve left ITB flexibility to min limitation to reduce pain  Long Term Goal 3: Patient will demonstrate safe ambulation without cane use  Long Term Goal 4: Patient will improve LEFS score from 37/80 to 50/80 to

## 2025-07-03 ENCOUNTER — HOSPITAL ENCOUNTER (OUTPATIENT)
Dept: PHYSICAL THERAPY | Age: 65
Setting detail: THERAPIES SERIES
Discharge: HOME | End: 2025-07-03
Payer: MEDICARE

## 2025-07-03 PROCEDURE — 97110 THERAPEUTIC EXERCISES: CPT

## 2025-07-03 PROCEDURE — 97140 MANUAL THERAPY 1/> REGIONS: CPT

## 2025-07-03 ASSESSMENT — PAIN SCALES - GENERAL: PAINLEVEL_OUTOF10: 1

## 2025-07-03 NOTE — PROGRESS NOTES
Phone: [phone]                 [facility]      Fax: [phone]                            Outpatient Physical Therapy                                                                            Daily Note    Date: 7/3/2025  Patient Name: Jenae Moreno        MRN: [id number]   ACCT#:  [id number]  : 1960  (65 y.o.)    Referring Provider (secondary): Esau Leblanc         Diagnosis: Greater trochanteric bursitis  Treatment Diagnosis: Left hip pain    Onset Date: 25  PT Insurance Information: Medicare, [employer] secondary  Total # of Visits Approved: 8 Per Physician Order  Total # of Visits to Date: 5  Plan of Care/Certification Expiration Date: 25    Pre-Treatment Pain:  1/10  Subjective: 1300:  Denies soreness from last visit.  Feels she is getting a little better.  Assessment  Assessment: Minor soreness after session, but tolerable.  Good tolerance to cupping, but no immediate improvement.  Patient considering trying IDN next week.    Plan  Continue with current plan of care    Exercises/Modalities/Manual:  See DocFlow Sheet    Education: Education on progression and cupping            Goals  (Total # of Visits to Date: 5)   Short Term Goals  Time Frame for Short Term Goals: 4 visits  Short Term Goal 1: Patient will demonstrate compliance with HEP to optimize therapy progress  STG Goal 1 Status:: Met    Long Term Goals  Time Frame for Long Term Goals : 8 visits  Long Term Goal 1: Patient will improve left hip MMT to 4+/5 in all planes to reduce load on ITB  Long Term Goal 2: Patient will improve left ITB flexibility to min limitation to reduce pain  Long Term Goal 3: Patient will demonstrate safe ambulation without cane use  Long Term Goal 4: Patient will improve LEFS score from 37/80 to 50/80 to demonstrate functional improvement    Treatment Tolerance:  Treatment Tolerance: Tolerated treatment well.    Post Treatment Pain:  2/10    Time In: 1300    Time Out : 1339

## 2025-07-07 ENCOUNTER — HOSPITAL ENCOUNTER (OUTPATIENT)
Dept: PHYSICAL THERAPY | Age: 65
Setting detail: THERAPIES SERIES
Discharge: HOME | End: 2025-07-07
Payer: MEDICARE

## 2025-07-07 PROCEDURE — 97140 MANUAL THERAPY 1/> REGIONS: CPT

## 2025-07-07 PROCEDURE — 97110 THERAPEUTIC EXERCISES: CPT

## 2025-07-07 ASSESSMENT — PAIN SCALES - GENERAL: PAINLEVEL_OUTOF10: 3

## 2025-07-07 NOTE — PROGRESS NOTES
Phone: [phone]                 [facility]      Fax: [phone]                            Outpatient Physical Therapy                                                                            Daily Note    Date: 2025  Patient Name: Jenae Moreno        MRN: [id number]   ACCT#:  [id number]  : 1960  (65 y.o.)    Referring Provider (secondary): Esau Leblanc         Diagnosis: Greater trochanteric bursitis  Treatment Diagnosis: Left hip pain    Onset Date: 25  PT Insurance Information: Medicare, [employer] secondary  Total # of Visits Approved: 8 Per Physician Order  Total # of Visits to Date: 6  Plan of Care/Certification Expiration Date: 25    Pre-Treatment Pain:  3/10  Subjective: 0930:  Stopped naproxen.  Responded well to cupping but did not bruise.  Assessment  Assessment: Denies pain after session.  Did well with increased reps for strengthening and time with cupping.  Will re-assess at next visit for possible extension.    Plan  Continue with current plan of care    Exercises/Modalities/Manual:  See DocFlow Sheet    Education: Education on progression of cupping and POC            Goals  (Total # of Visits to Date: 6)   Short Term Goals  Time Frame for Short Term Goals: 4 visits  Short Term Goal 1: Patient will demonstrate compliance with HEP to optimize therapy progress  STG Goal 1 Status:: Met    Long Term Goals  Time Frame for Long Term Goals : 8 visits  Long Term Goal 1: Patient will improve left hip MMT to 4+/5 in all planes to reduce load on ITB  Long Term Goal 2: Patient will improve left ITB flexibility to min limitation to reduce pain  Long Term Goal 3: Patient will demonstrate safe ambulation without cane use  Long Term Goal 4: Patient will improve LEFS score from 37/80 to 50/80 to demonstrate functional improvement    Treatment Tolerance:  Treatment Tolerance: Tolerated treatment well.    Post Treatment Pain:  0/10    Time In: 0930    Time Out : 1012

## 2025-07-09 ENCOUNTER — HOSPITAL ENCOUNTER (OUTPATIENT)
Dept: PHYSICAL THERAPY | Age: 65
Setting detail: THERAPIES SERIES
Discharge: HOME | End: 2025-07-09
Payer: MEDICARE

## 2025-07-09 PROCEDURE — 20561 NDL INSJ W/O NJX 3+ MUSC: CPT

## 2025-07-09 PROCEDURE — 97110 THERAPEUTIC EXERCISES: CPT

## 2025-07-09 ASSESSMENT — PAIN SCALES - GENERAL: PAINLEVEL_OUTOF10: 3

## 2025-07-09 NOTE — PROGRESS NOTES
Phone: [phone]                 [facility]      Fax: [phone]                            Outpatient Physical Therapy                                                                            Daily Note    Date: 2025  Patient Name: Jenae Moreno        MRN: [id number]   ACCT#:  [id number]  : 1960  (65 y.o.)    Referring Provider (secondary): Esau Leblanc         Diagnosis: Greater trochanteric bursitis  Treatment Diagnosis: Left hip pain    Onset Date: 25  PT Insurance Information: Medicare, [place] [facility] secondary  Total # of Visits Approved: 8 Per Physician Order  Total # of Visits to Date: 6  Plan of Care/Certification Expiration Date: 25    Pre-Treatment Pain:  3/10  Subjective: 0930:  Improved compliance with HEP.  Pain level remains about the same though.  Assessment  Assessment: Patient requested to re-start dry needling.  Will assess her response to this.  Consider lumbar or hip cause to her pain.  If symptoms persist into next week, will assess lumbar and/or hip at following visit.  Plan to extend POC.    Plan  Continue with current plan of care    Exercises/Modalities/Manual:  See DocFlow Sheet    Education: Education on IDN and possible hip/lumbar causes to pain            Goals  (Total # of Visits to Date: 6)   Short Term Goals  Time Frame for Short Term Goals: 4 visits  Short Term Goal 1: Patient will demonstrate compliance with HEP to optimize therapy progress  STG Goal 1 Status:: Met    Long Term Goals  Time Frame for Long Term Goals : 8 visits  Long Term Goal 1: Patient will improve left hip MMT to 4+/5 in all planes to reduce load on ITB  Long Term Goal 2: Patient will improve left ITB flexibility to min limitation to reduce pain  Long Term Goal 3: Patient will demonstrate safe ambulation without cane use  Long Term Goal 4: Patient will improve LEFS score from 37/80 to 50/80 to demonstrate functional improvement    Treatment Tolerance:  Treatment

## 2025-07-16 ENCOUNTER — HOSPITAL ENCOUNTER (OUTPATIENT)
Dept: PHYSICAL THERAPY | Age: 65
Setting detail: THERAPIES SERIES
Discharge: HOME | End: 2025-07-16
Payer: MEDICARE

## 2025-07-16 PROCEDURE — 97110 THERAPEUTIC EXERCISES: CPT

## 2025-07-16 ASSESSMENT — PAIN SCALES - GENERAL: PAINLEVEL_OUTOF10: 2

## 2025-07-16 NOTE — PROGRESS NOTES
Phone: [phone]                 [facility]      Fax: [phone]                            Outpatient Physical Therapy                                                                            Daily Note    Date: 2025  Patient Name: Jenae Moreno        MRN: [id number]   ACCT#:  [id number]  : 1960  (65 y.o.)    Referring Provider (secondary): Esau Leblanc         Diagnosis: Greater trochanteric bursitis  Treatment Diagnosis: Left hip pain    Onset Date: 25  PT Insurance Information: Medicare, [place] [facility] secondary  Total # of Visits Approved: 8 Per Physician Order  Total # of Visits to Date: 8  Plan of Care/Certification Expiration Date: 25    Pre-Treatment Pain:  210    Assessment  Assessment: Pt rates pain 10. Performed therex as outlined. Pt notes no sig overall change in IT band tightness. Performed therex as outlined followed by static cupping. Pt requests to hold therapy at this time. LEFS 39/80.    Plan  Place pt on hold    Exercises/Modalities/Manual:  See DocFlow Sheet    Education: cont hep            Goals  (Total # of Visits to Date: 8)   Short Term Goals  Time Frame for Short Term Goals: 4 visits  Short Term Goal 1: Patient will demonstrate compliance with HEP to optimize therapy progress  STG Goal 1 Status:: Met    Long Term Goals  Time Frame for Long Term Goals : 8 visits  Long Term Goal 1: Patient will improve left hip MMT to 4+/5 in all planes to reduce load on ITB-not met  Long Term Goal 2: Patient will improve left ITB flexibility to min limitation to reduce pain-not met  Long Term Goal 3: Patient will demonstrate safe ambulation without cane use-not met  Long Term Goal 4: Patient will improve LEFS score from 37/80 to 50/80 to demonstrate functional improvement-not met    Treatment Tolerance:  Treatment Tolerance: Tolerated treatment well.    Post Treatment Pain:  210    Time In: 1000    Time Out : 1045      Timed and total 45 min    Katlyn QUIROS